# Patient Record
Sex: MALE | URBAN - METROPOLITAN AREA
[De-identification: names, ages, dates, MRNs, and addresses within clinical notes are randomized per-mention and may not be internally consistent; named-entity substitution may affect disease eponyms.]

---

## 2020-06-01 ENCOUNTER — OFFICE VISIT (OUTPATIENT)
Dept: URBAN - METROPOLITAN AREA CLINIC 71 | Facility: CLINIC | Age: 60
End: 2020-06-01
Payer: COMMERCIAL

## 2020-06-01 DIAGNOSIS — H40.1132 PRIMARY OPEN-ANGLE GLAUCOMA, BILATERAL, MODERATE STAGE: Primary | ICD-10-CM

## 2020-06-01 DIAGNOSIS — H25.13 AGE-RELATED NUCLEAR CATARACT, BILATERAL: ICD-10-CM

## 2020-06-01 PROCEDURE — 92014 COMPRE OPH EXAM EST PT 1/>: CPT | Performed by: OPHTHALMOLOGY

## 2020-06-01 PROCEDURE — 92133 CPTRZD OPH DX IMG PST SGM ON: CPT | Performed by: OPHTHALMOLOGY

## 2020-06-01 RX ORDER — LATANOPROST 50 UG/ML
0.005 % SOLUTION OPHTHALMIC
Qty: 1 | Refills: 3 | Status: ACTIVE
Start: 2020-06-01

## 2020-06-01 ASSESSMENT — INTRAOCULAR PRESSURE
OS: 13
OD: 14

## 2020-06-01 NOTE — IMPRESSION/PLAN
Impression: Primary open-angle glaucoma, bilateral, moderate stage: X29.7113. Plan: Glaucoma is usually a disease of high pressure in the eye that damages the optic nerve and causes loss of peripheral vision and possibly even blindness. Glaucoma treatment with various combinations of eye drops will usually help to lower the eye pressure and prevent further damage. In advanced or poorly controlled cases, laser treatment of conventional glaucoma surgery may be necessary. Contact office if drops are causing pain, irritation, or blurry vision after use.  
Discussed the options of possible laser to minimize usage of eyedrops, pt decided to switch to a once a day drop for now
switch to Latanoprost OU qhs

## 2020-06-22 ENCOUNTER — OFFICE VISIT (OUTPATIENT)
Dept: URBAN - METROPOLITAN AREA CLINIC 75 | Facility: CLINIC | Age: 60
End: 2020-06-22
Payer: COMMERCIAL

## 2020-06-22 DIAGNOSIS — H52.4 PRESBYOPIA: Primary | ICD-10-CM

## 2020-06-22 PROCEDURE — 92014 COMPRE OPH EXAM EST PT 1/>: CPT | Performed by: OPTOMETRIST

## 2020-06-22 ASSESSMENT — INTRAOCULAR PRESSURE
OD: 16
OS: 15

## 2020-06-22 ASSESSMENT — VISUAL ACUITY
OD: 20/20
OS: 20/15

## 2020-06-22 NOTE — IMPRESSION/PLAN
Impression: Primary open-angle glaucoma, bilateral, moderate stage: N07.9130. Plan: Glaucoma is usually a disease of high pressure in the eye that damages the optic nerve and causes loss of peripheral vision and possibly even blindness. Glaucoma treatment with various combinations of eye drops will usually help to lower the eye pressure and prevent further damage. In advanced or poorly controlled cases, laser treatment of conventional glaucoma surgery may be necessary. Contact office if drops are causing pain, irritation, or blurry vision after use. Continue on latanoprost OU.

## 2020-06-22 NOTE — IMPRESSION/PLAN
Impression: Vitreous degeneration, bilateral: H43.813. Plan: The PVD is stable, and there is no evidence of a retinal tear or detachment on dilated exam with scleral depression. I again reviewed the signs and symptoms of a retinal tear and detachment in detail with the patient, including worsening flashes, new floaters, and development of a shadow/curtain in the peripheral visual field. The patient was advised to call immediately with any changes to [address] [facility] or increase in symptoms.

## 2020-08-19 ENCOUNTER — OFFICE VISIT (OUTPATIENT)
Dept: URBAN - METROPOLITAN AREA CLINIC 75 | Facility: CLINIC | Age: 60
End: 2020-08-19
Payer: COMMERCIAL

## 2020-08-19 PROCEDURE — 92014 COMPRE OPH EXAM EST PT 1/>: CPT | Performed by: OPTOMETRIST

## 2020-08-19 PROCEDURE — 92134 CPTRZ OPH DX IMG PST SGM RTA: CPT | Performed by: OPTOMETRIST

## 2020-08-19 ASSESSMENT — INTRAOCULAR PRESSURE
OD: 10
OS: 12

## 2020-08-19 NOTE — IMPRESSION/PLAN
Impression: Vitreous degeneration, bilateral: H43.813. Mac OCT done today. Appears mostly normal. Hx of PVD OU seen during exam-stable. no holes or tears. Plan: Observe.

## 2020-08-19 NOTE — IMPRESSION/PLAN
Impression: Primary open-angle glaucoma, bilateral, moderate stage: E54.5626. IOPs appear stable on current treatment with Latanoprost. Plan: Continue on latanoprost OU. May consider glaucoma procedure during cat sx to eliminate pts need for drops.

## 2020-08-19 NOTE — IMPRESSION/PLAN
Impression: Combined forms of age-related cataract, bilateral: H25.813-cataract OD is cause of decreasing va/smudge OD. No change to glasses rx. Mac OCT done today to confirm decrease to va OD/central smudge was not related to retinal pathology. Plan: Discussed option of cat sx vs. observation. Cataracts will continue to develop and worsen causing more difficulty with vision. Due to pts myopia, would need to consider cat sx OU rather than OD only to prevent anesometropia. 
Pt to return tomorrow for cat sherita

## 2020-08-20 ENCOUNTER — OFFICE VISIT (OUTPATIENT)
Dept: URBAN - METROPOLITAN AREA CLINIC 75 | Facility: CLINIC | Age: 60
End: 2020-08-20
Payer: COMMERCIAL

## 2020-08-20 DIAGNOSIS — H44.23 DEGENERATIVE MYOPIA, BILATERAL: ICD-10-CM

## 2020-08-20 PROCEDURE — 92133 CPTRZD OPH DX IMG PST SGM ON: CPT | Performed by: OPTOMETRIST

## 2020-08-20 PROCEDURE — 92014 COMPRE OPH EXAM EST PT 1/>: CPT | Performed by: OPTOMETRIST

## 2020-08-20 ASSESSMENT — VISUAL ACUITY
OD: 20/50
OS: 20/25

## 2020-08-20 ASSESSMENT — INTRAOCULAR PRESSURE
OD: 11
OS: 10

## 2020-08-20 NOTE — IMPRESSION/PLAN
Impression: Degenerative myopia, bilateral: H44.23. Pt is high myope. Plan: Pt to get retinal clearance prior to cat sx.

## 2020-08-20 NOTE — IMPRESSION/PLAN
Impression: Primary open-angle glaucoma, bilateral, moderate stage: E54.4037- Optic nerve OCT done today-increased c/d ratio. non compliance with drop use. Pt requests more permanent means to keep IOPs stable. Discussed and recommend pre op for Istent OU to be done at time of cat sx OU. Plan: Continue on latanoprost OU for now. Pt elects to pursue Istent OU at time of cat sx.

## 2020-08-20 NOTE — IMPRESSION/PLAN
Impression: Combined forms of age-related cataract, bilateral: H25.813- OD>OS. Pt symptomatic. Rapid onset cataracts. Decreased va since exam/refraction done in June. Due to myopia, will need to pursue cat sx OU rather than OD only to prevent anesometropia. Mac OCT done yesterday-stable. Recommend pre op for cat sx OU. Discussed risks and alteratives. Discussed plano endpoint vs. mono va outcome. Mono va may lessen need for glasses/prism following sx. Pt understands need for glasses/prism after cat sx if not pursuing mono va. Explained LASIK procedure-not recommended. Plan: Pt elects to proceed with pre op for cat sx OD then OS. Pt will not have insurance starting w/in the next few months so would like to have sx done before then. Pt rec'd new glasses rx and bought lenses in June 2020. Will attempt to return June lenses to optical dept and use credit to fill new rx s/p cat sx.

## 2020-09-11 ENCOUNTER — NEW PATIENT (OUTPATIENT)
Dept: URBAN - METROPOLITAN AREA CLINIC 70 | Facility: CLINIC | Age: 60
End: 2020-09-11
Payer: COMMERCIAL

## 2020-09-11 DIAGNOSIS — H25.813 COMBINED FORMS OF AGE-RELATED CATARACT, BILATERAL: ICD-10-CM

## 2020-09-11 PROCEDURE — 92134 CPTRZ OPH DX IMG PST SGM RTA: CPT | Performed by: OPHTHALMOLOGY

## 2020-09-11 PROCEDURE — 92004 COMPRE OPH EXAM NEW PT 1/>: CPT | Performed by: OPHTHALMOLOGY

## 2020-09-11 ASSESSMENT — INTRAOCULAR PRESSURE
OD: 14
OS: 14

## 2020-10-05 ENCOUNTER — PRE-OPERATIVE VISIT (OUTPATIENT)
Dept: URBAN - METROPOLITAN AREA CLINIC 71 | Facility: CLINIC | Age: 60
End: 2020-10-05
Payer: COMMERCIAL

## 2020-10-05 DIAGNOSIS — H43.813 VITREOUS DEGENERATION, BILATERAL: ICD-10-CM

## 2020-10-05 DIAGNOSIS — H52.223 REGULAR ASTIGMATISM, BILATERAL: ICD-10-CM

## 2020-10-05 PROCEDURE — 92014 COMPRE OPH EXAM EST PT 1/>: CPT | Performed by: OPHTHALMOLOGY

## 2020-10-05 PROCEDURE — 92136 OPHTHALMIC BIOMETRY: CPT | Performed by: OPHTHALMOLOGY

## 2020-10-05 RX ORDER — CIPROFLOXACIN HYDROCHLORIDE 3.5 MG/ML
0.3 % SOLUTION/ DROPS TOPICAL
Qty: 1 | Refills: 1 | Status: INACTIVE
Start: 2020-10-05 | End: 2020-10-14

## 2020-10-05 ASSESSMENT — PACHYMETRY
OS: 3.58
OD: 28.33
OD: 3.59
OS: 27.93

## 2020-10-05 ASSESSMENT — INTRAOCULAR PRESSURE
OS: 18
OD: 19

## 2020-10-05 NOTE — IMPRESSION/PLAN
Impression: Regular astigmatism, bilateral: H52.223. Plan: OU: Discussed diagnosis in detail with patient. Discussed treatment options with patient. No treatment is required at this time. Will continue to observe condition and or symptoms.

## 2020-10-05 NOTE — IMPRESSION/PLAN
Impression: Combined forms of age-related cataract, bilateral: H25.813. Plan: OU: Discussed diagnosis in detail with patient. Discussed treatment options with patient. Discussed risks and benefits and patient understands. Discussed signs and symptoms of retinal detachment. Discussed signs and symptoms of PVD/floaters. Discussed risks of progression. New medication(s) Rx given today. Surgical treatment is necessary to improve vision. Patient elects to have surgery. Schedule surgery in the OR. Surgical risks and benefits were discussed, explained and understood by patient. Lid scrubs and hygiene were explained. Patient instructed to use artificial tears as needed. Pre op instructions given and understood. Post op instructions given and understood. Educational materials provided: Cataract and Cataract extraction/lens. 

PROCEED WITH TRADITIONAL CATARACT SURGERY OD FIRST THEN OS FOR NEAR WITH TRIMOXI AND CIPRO BID X10 DAYS BEGINNING THE DAY BEFORE SURGERY

## 2020-10-05 NOTE — IMPRESSION/PLAN
Impression: Vitreous degeneration, bilateral: H43.813. Plan: OU: Discussed diagnosis in detail with patient. No treatment is required at this time. Discussed treatment options with patient. Will continue to observe condition and or symptoms.

## 2020-10-15 NOTE — IMPRESSION/PLAN
Impression: Primary open-angle glaucoma, bilateral, moderate stage: Q22.3097. Plan: OU: Discussed diagnosis in detail with patient. Discussed treatment options with patient. Discussed risks and benefits and patient understands. Will continue to monitor IOP. Discussed risks of progression. Surgical treatment is required necessary to improve intraocular pressure. Patient elects to have surgery. Surgical risks and benefits were discussed, explained and understood by patient. PROCEED WITH I-STENT INSERTION AT TIME OF CATARACT SURGRY OD FIRST THEN OS.

## 2020-10-20 ENCOUNTER — SURGERY (OUTPATIENT)
Dept: URBAN - METROPOLITAN AREA SURGERY 45 | Facility: SURGERY | Age: 60
End: 2020-10-20
Payer: COMMERCIAL

## 2020-10-20 ENCOUNTER — SURGERY (OUTPATIENT)
Dept: URBAN - METROPOLITAN AREA SURGERY 44 | Facility: SURGERY | Age: 60
End: 2020-10-20
Payer: COMMERCIAL

## 2020-10-20 PROCEDURE — 0191T INSERTION OF ANTERIOR SEGMENT AQUEOUS DRAINAGE DEVICE, W/OUT EXTRAOCULAR RESERVO: CPT | Performed by: OPHTHALMOLOGY

## 2020-10-20 PROCEDURE — 66984 XCAPSL CTRC RMVL W/O ECP: CPT | Performed by: OPHTHALMOLOGY

## 2020-10-20 PROCEDURE — 0376T INSERT ANT SEG AQUEOUS DEVICE; EA ADDTL: CPT | Performed by: OPHTHALMOLOGY

## 2020-10-21 ENCOUNTER — POST-OPERATIVE VISIT (OUTPATIENT)
Dept: URBAN - METROPOLITAN AREA CLINIC 71 | Facility: CLINIC | Age: 60
End: 2020-10-21
Payer: COMMERCIAL

## 2020-10-21 DIAGNOSIS — Z48.810 ENCOUNTER FOR SURGICAL AFTERCARE FOLLOWING SURGERY ON A SENSE ORGAN: Primary | ICD-10-CM

## 2020-10-21 PROCEDURE — 99024 POSTOP FOLLOW-UP VISIT: CPT | Performed by: OPHTHALMOLOGY

## 2020-10-21 ASSESSMENT — INTRAOCULAR PRESSURE
OS: 12
OD: 14

## 2020-10-21 NOTE — IMPRESSION/PLAN
Impression: S/P SA60AT 12.5 istent OD - 1 Day. Encounter for surgical aftercare following surgery on a sense organ  Z48.810. Plan: iol stable and in place. reviewed postoperative instructions with patient. Continue postoperative medication as directed. ok to proceed with second eye. F/U as planned for second eye surgery OS.  --Continue Cipro

## 2020-10-27 ENCOUNTER — SURGERY (OUTPATIENT)
Dept: URBAN - METROPOLITAN AREA SURGERY 45 | Facility: SURGERY | Age: 60
End: 2020-10-27
Payer: COMMERCIAL

## 2020-10-27 ENCOUNTER — SURGERY (OUTPATIENT)
Dept: URBAN - METROPOLITAN AREA SURGERY 44 | Facility: SURGERY | Age: 60
End: 2020-10-27
Payer: COMMERCIAL

## 2020-10-27 PROCEDURE — L8612 AQUEOUS SHUNT PROSTHESIS: HCPCS | Performed by: CLINIC/CENTER

## 2020-10-27 PROCEDURE — 66984 XCAPSL CTRC RMVL W/O ECP: CPT | Performed by: OPHTHALMOLOGY

## 2020-10-27 PROCEDURE — 0376T INSERT ANT SEG AQUEOUS DEVICE; EA ADDTL: CPT | Performed by: OPHTHALMOLOGY

## 2020-10-27 PROCEDURE — 0191T INSERTION OF ANTERIOR SEGMENT AQUEOUS DRAINAGE DEVICE, W/OUT EXTRAOCULAR RESERVO: CPT | Performed by: OPHTHALMOLOGY

## 2020-10-28 ENCOUNTER — POST-OPERATIVE VISIT (OUTPATIENT)
Dept: URBAN - METROPOLITAN AREA CLINIC 75 | Facility: CLINIC | Age: 60
End: 2020-10-28
Payer: COMMERCIAL

## 2020-10-28 PROCEDURE — 99024 POSTOP FOLLOW-UP VISIT: CPT | Performed by: OPTOMETRIST

## 2020-10-28 ASSESSMENT — INTRAOCULAR PRESSURE
OS: 17
OD: 18

## 2020-10-28 ASSESSMENT — VISUAL ACUITY
OS: 20/25
OD: 20/30

## 2020-10-28 NOTE — IMPRESSION/PLAN
Impression:  Presence of intraocular lens  Z96.1. Plan: Refraction completed today. Per results pt will be able to return to normal work duties without restrictions as wound incisions will be healed on November 9th. Glasses will be dispensed tomorrow for patient to pickup. Fresnel prism will be offered. Pt knows this will be a temporary pair of glasses and his final Rx will be in 3 weeks after he is fully healed. Discussed findings w/pt. Pt needing 14 base out at today's visit.

## 2020-11-16 ENCOUNTER — POST-OPERATIVE VISIT (OUTPATIENT)
Dept: URBAN - METROPOLITAN AREA CLINIC 75 | Facility: CLINIC | Age: 60
End: 2020-11-16
Payer: COMMERCIAL

## 2020-11-16 DIAGNOSIS — Z96.1 PRESENCE OF INTRAOCULAR LENS: Primary | ICD-10-CM

## 2020-11-16 PROCEDURE — 99024 POSTOP FOLLOW-UP VISIT: CPT | Performed by: OPTOMETRIST

## 2020-11-16 ASSESSMENT — INTRAOCULAR PRESSURE
OS: 13
OD: 12

## 2020-11-16 ASSESSMENT — VISUAL ACUITY
OD: 20/25
OS: 20/20

## 2020-11-16 NOTE — IMPRESSION/PLAN
Impression:  Presence of intraocular lens  Z96.1. Plan: Discussed diagnosis with Pt. Glasses RX completed. Pt to discontinue Latanoprost drops. Re-evaluated in 3 months depending on how IOP are doing. Will place him back on Latanoprost if pressures elevate.

## 2022-06-11 ENCOUNTER — OFFICE VISIT (OUTPATIENT)
Dept: URBAN - METROPOLITAN AREA CLINIC 75 | Facility: CLINIC | Age: 62
End: 2022-06-11
Payer: COMMERCIAL

## 2022-06-11 DIAGNOSIS — H04.123 DRY EYE SYNDROME OF BILATERAL LACRIMAL GLANDS: ICD-10-CM

## 2022-06-11 DIAGNOSIS — H43.813 VITREOUS DEGENERATION, BILATERAL: ICD-10-CM

## 2022-06-11 DIAGNOSIS — H40.1132 PRIMARY OPEN-ANGLE GLAUCOMA, BILATERAL, MODERATE STAGE: Primary | ICD-10-CM

## 2022-06-11 DIAGNOSIS — H26.493 OTHER SECONDARY CATARACT, BILATERAL: ICD-10-CM

## 2022-06-11 DIAGNOSIS — H52.4 PRESBYOPIA: ICD-10-CM

## 2022-06-11 PROCEDURE — 92133 CPTRZD OPH DX IMG PST SGM ON: CPT | Performed by: OPTOMETRIST

## 2022-06-11 PROCEDURE — 99214 OFFICE O/P EST MOD 30 MIN: CPT | Performed by: OPTOMETRIST

## 2022-06-11 PROCEDURE — 92134 CPTRZ OPH DX IMG PST SGM RTA: CPT | Performed by: OPTOMETRIST

## 2022-06-11 ASSESSMENT — INTRAOCULAR PRESSURE
OD: 11
OS: 13

## 2022-06-11 NOTE — IMPRESSION/PLAN
Impression: Presbyopia: H52.4. Plan: Discussed returning to update RX and creating a Trifocals to not create diplopia based on too much magnification. Prism most likely to stay as is due to keeping diplopia stable.

## 2022-06-11 NOTE — IMPRESSION/PLAN
Impression: Dry eye syndrome of bilateral lacrimal glands: H04.123. Proptosis stable with Graves DX - Blinking rate down creating dry eye Plan: Dry eye syndrome requires lubrication of the eye with artificial tears and nighttime ointments or gels. In addition, topical and oral anti-inflammatory medications are usually necessary to treat the associated ocular irritation. Recommend 3-4 drops daily of OTC drops such as Systane or Refresh. Contact office if dry eye does not improve, worsens or blurs vision.

## 2022-06-11 NOTE — IMPRESSION/PLAN
Impression: Primary open-angle glaucoma, bilateral, moderate stage: Q13.3853. OCT ordered and performed OD>OS Plan: Glaucoma is usually a disease of high pressure in the eye that damages the optic nerve and causes loss of peripheral vision and possibly even blindness. Glaucoma treatment with various combinations of eye drops will usually help to lower the eye pressure and prevent further damage. In advanced or poorly controlled cases, laser treatment of conventional glaucoma surgery may be necessary. Continue w/o gtts at this time, stints from Cat SX stabilizing IOP at this time. Patient recommended Vitamin D, Turmeric, Niacin (B complex) supplement. Contact office with any changes in vision or concerns.

## 2022-06-13 ENCOUNTER — OFFICE VISIT (OUTPATIENT)
Dept: URBAN - METROPOLITAN AREA CLINIC 75 | Facility: CLINIC | Age: 62
End: 2022-06-13
Payer: COMMERCIAL

## 2022-06-13 DIAGNOSIS — H04.123 DRY EYE SYNDROME OF BILATERAL LACRIMAL GLANDS: ICD-10-CM

## 2022-06-13 DIAGNOSIS — H52.4 PRESBYOPIA: Primary | ICD-10-CM

## 2022-06-13 PROCEDURE — 92014 COMPRE OPH EXAM EST PT 1/>: CPT | Performed by: OPTOMETRIST

## 2022-06-13 ASSESSMENT — VISUAL ACUITY
OS: 20/20
OD: 20/40

## 2022-06-13 ASSESSMENT — INTRAOCULAR PRESSURE
OD: 11
OS: 14

## 2022-06-13 NOTE — IMPRESSION/PLAN
Impression: Dry eye syndrome of bilateral lacrimal glands: H04.123. Plan: Dry eye syndrome requires lubrication of the eye with artificial tears and nighttime ointments or gels. In addition, topical and oral anti-inflammatory medications are usually necessary to treat the associated ocular irritation. Recommend 3-4 drops daily of OTC drops such as Systane or Refresh. Refresh sample given in office. Educated on Preservative Free drops when used more than 4x a day. Pt to being Pred BID/TID OU for two weeks. Contact office if dry eye does not improve, worsens or blurs vision.

## 2023-01-11 ENCOUNTER — OFFICE VISIT (OUTPATIENT)
Dept: URBAN - METROPOLITAN AREA CLINIC 75 | Facility: CLINIC | Age: 63
End: 2023-01-11
Payer: COMMERCIAL

## 2023-01-11 DIAGNOSIS — H43.813 VITREOUS DEGENERATION, BILATERAL: ICD-10-CM

## 2023-01-11 DIAGNOSIS — H52.4 PRESBYOPIA: ICD-10-CM

## 2023-01-11 DIAGNOSIS — H40.1132 PRIMARY OPEN-ANGLE GLAUCOMA, BILATERAL, MODERATE STAGE: Primary | ICD-10-CM

## 2023-01-11 DIAGNOSIS — H04.123 DRY EYE SYNDROME OF BILATERAL LACRIMAL GLANDS: ICD-10-CM

## 2023-01-11 PROCEDURE — 92083 EXTENDED VISUAL FIELD XM: CPT | Performed by: OPTOMETRIST

## 2023-01-11 PROCEDURE — 99213 OFFICE O/P EST LOW 20 MIN: CPT | Performed by: OPTOMETRIST

## 2023-01-11 ASSESSMENT — INTRAOCULAR PRESSURE
OD: 10
OS: 12

## 2023-01-11 NOTE — IMPRESSION/PLAN
Impression: Dry eye syndrome of bilateral lacrimal glands: H04.123. Plan: Dry eye syndrome requires lubrication of the eye with artificial tears and nighttime ointments or gels. In addition, topical and oral anti-inflammatory medications are usually necessary to treat the associated ocular irritation. Recommend 3-4 drops daily of OTC drops such as Systane, retain, or Refresh. Educated on Preservative Free drops when used more than 4x a day. Contact office if dry eye does not improve, worsens or blurs vision.

## 2023-01-11 NOTE — IMPRESSION/PLAN
Impression: Primary open-angle glaucoma, bilateral, moderate stage: H92.9819. VF & Optos ordered and performed Plan: Glaucoma is usually a disease of high pressure in the eye that damages the optic nerve and causes loss of peripheral vision and possibly even blindness. Glaucoma treatment with various combinations of eye drops will usually help to lower the eye pressure and prevent further damage. In advanced or poorly controlled cases, laser treatment of conventional glaucoma surgery may be necessary. Continue w/o gtts at this time, stints from Cat SX stabilizing IOP at this time. Patient recommended Vitamin D, Turmeric, Niacin (B complex) supplement. Contact office with any changes in vision or concerns.

## 2023-08-01 ENCOUNTER — OFFICE VISIT (OUTPATIENT)
Dept: URBAN - METROPOLITAN AREA CLINIC 75 | Facility: CLINIC | Age: 63
End: 2023-08-01
Payer: COMMERCIAL

## 2023-08-01 DIAGNOSIS — H40.1132 PRIMARY OPEN-ANGLE GLAUCOMA, BILATERAL, MODERATE STAGE: Primary | ICD-10-CM

## 2023-08-01 DIAGNOSIS — H43.813 VITREOUS DEGENERATION, BILATERAL: ICD-10-CM

## 2023-08-01 PROCEDURE — 99213 OFFICE O/P EST LOW 20 MIN: CPT | Performed by: OPTOMETRIST

## 2023-08-01 PROCEDURE — 92133 CPTRZD OPH DX IMG PST SGM ON: CPT | Performed by: OPTOMETRIST

## 2023-08-01 ASSESSMENT — INTRAOCULAR PRESSURE
OD: 13
OS: 11

## 2023-10-20 ENCOUNTER — OFFICE VISIT (OUTPATIENT)
Dept: URBAN - METROPOLITAN AREA CLINIC 75 | Facility: CLINIC | Age: 63
End: 2023-10-20
Payer: COMMERCIAL

## 2023-10-20 DIAGNOSIS — H52.4 PRESBYOPIA: Primary | ICD-10-CM

## 2023-10-20 DIAGNOSIS — H43.811 VITREOUS DEGENERATION, RIGHT EYE: ICD-10-CM

## 2023-10-20 PROCEDURE — 92014 COMPRE OPH EXAM EST PT 1/>: CPT | Performed by: OPTOMETRIST

## 2023-10-20 ASSESSMENT — INTRAOCULAR PRESSURE
OD: 15
OS: 9

## 2023-10-20 ASSESSMENT — VISUAL ACUITY
OD: 20/25
OS: 20/20

## 2024-10-21 ENCOUNTER — OFFICE VISIT (OUTPATIENT)
Dept: URBAN - METROPOLITAN AREA CLINIC 75 | Facility: CLINIC | Age: 64
End: 2024-10-21
Payer: COMMERCIAL

## 2024-10-21 DIAGNOSIS — H52.4 PRESBYOPIA: Primary | ICD-10-CM

## 2024-10-21 PROCEDURE — 92014 COMPRE OPH EXAM EST PT 1/>: CPT | Performed by: OPTOMETRIST

## 2024-10-21 ASSESSMENT — VISUAL ACUITY
OD: 20/30
OS: 20/20

## 2024-10-21 ASSESSMENT — INTRAOCULAR PRESSURE
OS: 13
OD: 13

## 2025-04-21 ENCOUNTER — OFFICE VISIT (OUTPATIENT)
Dept: URBAN - METROPOLITAN AREA CLINIC 75 | Facility: CLINIC | Age: 65
End: 2025-04-21
Payer: COMMERCIAL

## 2025-04-21 DIAGNOSIS — H47.233 GLAUCOMATOUS OPTIC ATROPHY, BILATERAL: ICD-10-CM

## 2025-04-21 DIAGNOSIS — H40.1132 PRIMARY OPEN-ANGLE GLAUCOMA, BILATERAL, MODERATE STAGE: Primary | ICD-10-CM

## 2025-04-21 PROCEDURE — 99214 OFFICE O/P EST MOD 30 MIN: CPT | Performed by: OPTOMETRIST

## 2025-04-21 PROCEDURE — 92133 CPTRZD OPH DX IMG PST SGM ON: CPT | Performed by: OPTOMETRIST

## 2025-04-21 PROCEDURE — 92134 CPTRZ OPH DX IMG PST SGM RTA: CPT | Performed by: OPTOMETRIST

## 2025-04-21 ASSESSMENT — INTRAOCULAR PRESSURE
OD: 14
OS: 16